# Patient Record
Sex: FEMALE
[De-identification: names, ages, dates, MRNs, and addresses within clinical notes are randomized per-mention and may not be internally consistent; named-entity substitution may affect disease eponyms.]

---

## 2022-02-04 PROBLEM — Z00.00 ENCOUNTER FOR PREVENTIVE HEALTH EXAMINATION: Status: ACTIVE | Noted: 2022-02-04

## 2022-02-17 ENCOUNTER — APPOINTMENT (OUTPATIENT)
Dept: NEUROSURGERY | Facility: CLINIC | Age: 72
End: 2022-02-17
Payer: MEDICARE

## 2022-02-17 VITALS
OXYGEN SATURATION: 98 % | BODY MASS INDEX: 24.41 KG/M2 | TEMPERATURE: 98 F | HEIGHT: 64 IN | WEIGHT: 143 LBS | HEART RATE: 76 BPM | SYSTOLIC BLOOD PRESSURE: 111 MMHG | DIASTOLIC BLOOD PRESSURE: 78 MMHG

## 2022-02-17 DIAGNOSIS — Z86.79 PERSONAL HISTORY OF OTHER DISEASES OF THE CIRCULATORY SYSTEM: ICD-10-CM

## 2022-02-17 PROCEDURE — 99203 OFFICE O/P NEW LOW 30 MIN: CPT

## 2022-02-18 NOTE — ASSESSMENT
[FreeTextEntry1] : Impression: 71yr old female with mra brain report reading small david aneurysm, no imaging available for review only reports\par \par Plan:\par Recommend mailing us the cd of the mri and mra brain\par Discussed the aneurysm is small and should she wish to know definitely whether it is present or not she can undergo diagnostic cerebral angiogram. Risks and benefits of angiography reviewed with patient and son in detail\par Educated patient on signs and symptoms of subarachnoid hemorrhage and should they experience worst headache of life will seek medical attention immediately.\par Recommend repeat mra brain non con in 1year then telehalth visit after

## 2022-02-18 NOTE — HISTORY OF PRESENT ILLNESS
[de-identified] : Buck Jones is a71yr old left handed female with pmh of htn, hjgh cholesterol and depression. She was experiencing dizziness and hand shaking went to see neurologist Dr. Rivera who ordered mri mra brain. MRA brain noted small david aneurysm. Today she presents feeling well denies any motor sensory speech visual abnormalities.

## 2022-03-31 ENCOUNTER — APPOINTMENT (OUTPATIENT)
Dept: NEUROSURGERY | Facility: CLINIC | Age: 72
End: 2022-03-31

## 2022-04-07 ENCOUNTER — APPOINTMENT (OUTPATIENT)
Dept: NEUROSURGERY | Facility: CLINIC | Age: 72
End: 2022-04-07
Payer: MEDICARE

## 2022-04-07 PROCEDURE — 99212 OFFICE O/P EST SF 10 MIN: CPT

## 2022-04-08 NOTE — REASON FOR VISIT
[Follow-Up: _____] : a [unfilled] follow-up visit [Spouse] : spouse [FreeTextEntry1] : Buck Jones is a71yr old left handed female with pmh of htn, hjgh cholesterol and depression. She was experiencing dizziness and hand shaking went to see neurologist Dr. Rivera who ordered mri mra brain. MRA brain noted small david aneurysm. Today she presents feeling well denies any motor sensory speech visual abnormalities. \par

## 2022-04-08 NOTE — ASSESSMENT
[FreeTextEntry1] : Impression: 71yr old female with mra brain report reading small david cavernous segment aneurysm, \par \par Plan:\par Reviewed imaging which shows possible small david cavernous segment aneurysm\par Discussed the aneurysm is small and should she wish to know definitely whether it is present or not she can undergo diagnostic cerebral angiogram. Risks and benefits of angiography reviewed with patient and son in detail\par Educated patient on signs and symptoms of subarachnoid hemorrhage and should they experience worst headache of life will seek medical attention immediately.\par Recommend repeat mra brain non con in 5year then telehalth visit after. \par

## 2023-02-23 ENCOUNTER — APPOINTMENT (OUTPATIENT)
Dept: NEUROSURGERY | Facility: CLINIC | Age: 73
End: 2023-02-23

## 2023-02-27 NOTE — REASON FOR VISIT
[Home] : at home, [unfilled] , at the time of the visit. [Medical Office: ([facility])___] : at the medical office located in  [Follow-Up: _____] : a [unfilled] follow-up visit Admission

## 2023-03-02 ENCOUNTER — APPOINTMENT (OUTPATIENT)
Dept: NEUROSURGERY | Facility: CLINIC | Age: 73
End: 2023-03-02
Payer: MEDICARE

## 2023-03-02 ENCOUNTER — APPOINTMENT (OUTPATIENT)
Dept: NEUROSURGERY | Facility: CLINIC | Age: 73
End: 2023-03-02

## 2023-03-02 DIAGNOSIS — Z86.39 PERSONAL HISTORY OF OTHER ENDOCRINE, NUTRITIONAL AND METABOLIC DISEASE: ICD-10-CM

## 2023-03-02 DIAGNOSIS — I67.1 CEREBRAL ANEURYSM, NONRUPTURED: ICD-10-CM

## 2023-03-02 PROCEDURE — 99442: CPT

## 2023-03-02 RX ORDER — METFORMIN HYDROCHLORIDE 625 MG/1
TABLET ORAL
Refills: 0 | Status: ACTIVE | COMMUNITY

## 2023-03-02 RX ORDER — ATORVASTATIN CALCIUM 40 MG/1
40 TABLET, FILM COATED ORAL
Refills: 0 | Status: ACTIVE | COMMUNITY

## 2023-03-02 RX ORDER — LOSARTAN POTASSIUM 100 MG/1
100 TABLET, FILM COATED ORAL
Refills: 0 | Status: ACTIVE | COMMUNITY

## 2023-03-22 NOTE — HISTORY OF PRESENT ILLNESS
[Home] : at home, [unfilled] , at the time of the visit. [Medical Office: ([facility])___] : at the medical office located in  [Verbal consent obtained from patient] : the patient, [unfilled] [FreeTextEntry1] : Buck Jones is a 72 yr old left handed female with PMH of HTN, HLD and depression. She was experiencing dizziness and hand shaking went to see neurologist Dr. Rivera who ordered MRI/MRAbrain.\par \par  MRA brain noted small KATERINE aneurysm.  She had a follow up MRA brain on 2/24/23 done at [facility] however the report was unavailable at this visit.  Will follow up.\par \par  Today she presents feeling well denies any motor sensory speech visual abnormalities. \par

## 2023-03-22 NOTE — ASSESSMENT
[FreeTextEntry1] : IMPRESSION\par 72 yr old left handed female with small KATERINE aneurysm. \par \par Pt had a collow up MRA brain done on 2/24/23 at [facility] however the report was unavailable. (will follow up)\par \par \par 1. MRA BRAIN IN 2 YEARS 2/2025.\par \par \par \par \par

## 2023-08-15 ENCOUNTER — APPOINTMENT (OUTPATIENT)
Dept: MRI IMAGING | Facility: CLINIC | Age: 73
End: 2023-08-15
Payer: MEDICARE

## 2023-08-15 ENCOUNTER — OUTPATIENT (OUTPATIENT)
Dept: OUTPATIENT SERVICES | Facility: HOSPITAL | Age: 73
LOS: 1 days | End: 2023-08-15
Payer: COMMERCIAL

## 2023-08-15 DIAGNOSIS — I67.1 CEREBRAL ANEURYSM, NONRUPTURED: ICD-10-CM

## 2023-08-15 PROCEDURE — 70544 MR ANGIOGRAPHY HEAD W/O DYE: CPT | Mod: 26

## 2023-08-15 PROCEDURE — 70544 MR ANGIOGRAPHY HEAD W/O DYE: CPT

## 2023-09-28 ENCOUNTER — APPOINTMENT (OUTPATIENT)
Dept: NEUROSURGERY | Facility: CLINIC | Age: 73
End: 2023-09-28
Payer: MEDICARE

## 2023-09-28 PROCEDURE — 99441: CPT
